# Patient Record
Sex: MALE | Race: BLACK OR AFRICAN AMERICAN | NOT HISPANIC OR LATINO | Employment: FULL TIME | URBAN - METROPOLITAN AREA
[De-identification: names, ages, dates, MRNs, and addresses within clinical notes are randomized per-mention and may not be internally consistent; named-entity substitution may affect disease eponyms.]

---

## 2017-05-30 ENCOUNTER — ALLSCRIPTS OFFICE VISIT (OUTPATIENT)
Dept: OTHER | Facility: OTHER | Age: 42
End: 2017-05-30

## 2017-05-30 DIAGNOSIS — Z13.6 ENCOUNTER FOR SCREENING FOR CARDIOVASCULAR DISORDERS: ICD-10-CM

## 2017-05-30 DIAGNOSIS — E78.1 PURE HYPERGLYCERIDEMIA: ICD-10-CM

## 2017-06-05 ENCOUNTER — APPOINTMENT (OUTPATIENT)
Dept: LAB | Facility: CLINIC | Age: 42
End: 2017-06-05
Payer: COMMERCIAL

## 2017-06-05 ENCOUNTER — TRANSCRIBE ORDERS (OUTPATIENT)
Dept: LAB | Facility: CLINIC | Age: 42
End: 2017-06-05

## 2017-06-05 DIAGNOSIS — Z13.6 ENCOUNTER FOR SCREENING FOR CARDIOVASCULAR DISORDERS: ICD-10-CM

## 2017-06-05 LAB
ALBUMIN SERPL BCP-MCNC: 3.9 G/DL (ref 3.5–5)
ALP SERPL-CCNC: 64 U/L (ref 46–116)
ALT SERPL W P-5'-P-CCNC: 30 U/L (ref 12–78)
ANION GAP SERPL CALCULATED.3IONS-SCNC: 5 MMOL/L (ref 4–13)
AST SERPL W P-5'-P-CCNC: 20 U/L (ref 5–45)
BASOPHILS # BLD AUTO: 0.02 THOUSANDS/ΜL (ref 0–0.1)
BASOPHILS NFR BLD AUTO: 0 % (ref 0–1)
BILIRUB SERPL-MCNC: 0.59 MG/DL (ref 0.2–1)
BUN SERPL-MCNC: 9 MG/DL (ref 5–25)
CALCIUM SERPL-MCNC: 8.8 MG/DL (ref 8.3–10.1)
CHLORIDE SERPL-SCNC: 104 MMOL/L (ref 100–108)
CHOLEST SERPL-MCNC: 192 MG/DL (ref 50–200)
CO2 SERPL-SCNC: 27 MMOL/L (ref 21–32)
CREAT SERPL-MCNC: 1.1 MG/DL (ref 0.6–1.3)
EOSINOPHIL # BLD AUTO: 0.08 THOUSAND/ΜL (ref 0–0.61)
EOSINOPHIL NFR BLD AUTO: 2 % (ref 0–6)
ERYTHROCYTE [DISTWIDTH] IN BLOOD BY AUTOMATED COUNT: 14.9 % (ref 11.6–15.1)
GFR SERPL CREATININE-BSD FRML MDRD: >60 ML/MIN/1.73SQ M
GLUCOSE P FAST SERPL-MCNC: 85 MG/DL (ref 65–99)
HCT VFR BLD AUTO: 45 % (ref 36.5–49.3)
HDLC SERPL-MCNC: 36 MG/DL (ref 40–60)
HGB BLD-MCNC: 15 G/DL (ref 12–17)
LDLC SERPL CALC-MCNC: 105 MG/DL (ref 0–100)
LYMPHOCYTES # BLD AUTO: 2.63 THOUSANDS/ΜL (ref 0.6–4.47)
LYMPHOCYTES NFR BLD AUTO: 48 % (ref 14–44)
MCH RBC QN AUTO: 27.6 PG (ref 26.8–34.3)
MCHC RBC AUTO-ENTMCNC: 33.3 G/DL (ref 31.4–37.4)
MCV RBC AUTO: 83 FL (ref 82–98)
MONOCYTES # BLD AUTO: 0.27 THOUSAND/ΜL (ref 0.17–1.22)
MONOCYTES NFR BLD AUTO: 5 % (ref 4–12)
NEUTROPHILS # BLD AUTO: 2.47 THOUSANDS/ΜL (ref 1.85–7.62)
NEUTS SEG NFR BLD AUTO: 45 % (ref 43–75)
NRBC BLD AUTO-RTO: 0 /100 WBCS
PLATELET # BLD AUTO: 252 THOUSANDS/UL (ref 149–390)
PMV BLD AUTO: 10.3 FL (ref 8.9–12.7)
POTASSIUM SERPL-SCNC: 4.5 MMOL/L (ref 3.5–5.3)
PROT SERPL-MCNC: 7.5 G/DL (ref 6.4–8.2)
RBC # BLD AUTO: 5.44 MILLION/UL (ref 3.88–5.62)
SODIUM SERPL-SCNC: 136 MMOL/L (ref 136–145)
TRIGL SERPL-MCNC: 256 MG/DL
TSH SERPL DL<=0.05 MIU/L-ACNC: 0.85 UIU/ML (ref 0.36–3.74)
WBC # BLD AUTO: 5.48 THOUSAND/UL (ref 4.31–10.16)

## 2017-06-05 PROCEDURE — 84443 ASSAY THYROID STIM HORMONE: CPT

## 2017-06-05 PROCEDURE — 85025 COMPLETE CBC W/AUTO DIFF WBC: CPT

## 2017-06-05 PROCEDURE — 36415 COLL VENOUS BLD VENIPUNCTURE: CPT

## 2017-06-05 PROCEDURE — 80053 COMPREHEN METABOLIC PANEL: CPT

## 2017-06-05 PROCEDURE — 80061 LIPID PANEL: CPT

## 2017-06-12 ENCOUNTER — ALLSCRIPTS OFFICE VISIT (OUTPATIENT)
Dept: OTHER | Facility: OTHER | Age: 42
End: 2017-06-12

## 2017-06-28 ENCOUNTER — ALLSCRIPTS OFFICE VISIT (OUTPATIENT)
Dept: OTHER | Facility: OTHER | Age: 42
End: 2017-06-28

## 2017-11-02 ENCOUNTER — ALLSCRIPTS OFFICE VISIT (OUTPATIENT)
Dept: OTHER | Facility: OTHER | Age: 42
End: 2017-11-02

## 2017-11-02 DIAGNOSIS — Z00.00 ENCOUNTER FOR GENERAL ADULT MEDICAL EXAMINATION WITHOUT ABNORMAL FINDINGS: ICD-10-CM

## 2017-11-02 DIAGNOSIS — E78.1 PURE HYPERGLYCERIDEMIA: ICD-10-CM

## 2018-01-13 VITALS
TEMPERATURE: 96.4 F | OXYGEN SATURATION: 99 % | WEIGHT: 178 LBS | DIASTOLIC BLOOD PRESSURE: 88 MMHG | SYSTOLIC BLOOD PRESSURE: 116 MMHG | HEART RATE: 83 BPM | BODY MASS INDEX: 24.11 KG/M2 | HEIGHT: 72 IN

## 2018-01-13 VITALS
WEIGHT: 181.38 LBS | DIASTOLIC BLOOD PRESSURE: 78 MMHG | TEMPERATURE: 97.5 F | BODY MASS INDEX: 24.57 KG/M2 | HEIGHT: 72 IN | SYSTOLIC BLOOD PRESSURE: 120 MMHG

## 2018-01-14 VITALS
SYSTOLIC BLOOD PRESSURE: 100 MMHG | HEIGHT: 72 IN | DIASTOLIC BLOOD PRESSURE: 70 MMHG | BODY MASS INDEX: 24.69 KG/M2 | WEIGHT: 182.25 LBS | TEMPERATURE: 97 F

## 2018-01-15 NOTE — PROGRESS NOTES
Assessment    1  Encounter for preventive health examination (V70 0) (Z00 00)    Discussion/Summary  Impression: health maintenance visit  Patient discussion: discussed with the patient  The patient was counseled regarding diagnostic results, instructions for management, risk factor reductions, prognosis, patient and family education, impressions, risks and benefits of treatment options, importance of compliance with treatment  The treatment plan was reviewed with the patient/guardian  The patient/guardian understands and agrees with the treatment plan      Chief Complaint  yearly Pe, need drivers form completed        History of Present Illness  HM, Adult Male: The patient is being seen for a health maintenance evaluation  The last health maintenance visit was 12 month(s) ago  General Health: The patient's health since the last visit is described as good  He has regular dental visits  He denies vision problems  He denies hearing loss  Lifestyle:  He consumes a diverse and healthy diet  He exercises regularly  He uses tobacco  He consumes alcohol  He denies drug use  Screening:      Review of Systems    Constitutional: no fever and no chills  Eyes: no eyesight problems  ENT: no hearing loss  Cardiovascular: no chest pain and no palpitations  Respiratory: no shortness of breath and no wheezing  Gastrointestinal: no abdominal pain, no nausea, no vomiting, no constipation, no diarrhea and no blood in stools  Genitourinary: no dysuria  Musculoskeletal: no arthralgias and no myalgias  Integumentary: no rashes and no skin lesions  Neurological: negative for seizures, but no fainting  Psychiatric: no anxiety and no depression  Hematologic/Lymphatic: no swollen glands and no swollen glands in the neck  Active Problems    1  Acute maxillary sinusitis (461 0) (J01 00)   2  Hypertriglyceridemia (272 1) (E78 1)   3  Screening for cardiovascular condition (V81 2) (Z13 6)   4   SOB (shortness of breath) on exertion (786 05) (R06 02)    Family History  Mother    · Family history of human immunodeficiency virus infection (V18 8) (Z83 1)    Social History    · Current every day smoker (305 1) (F17 200)   · Employed   · Has 7 children   ·    · Occasional alcohol use    Allergies    1  No Known Drug Allergies    Vitals   Recorded: [id number] 03:18PM   Temperature 98 F   Heart Rate 95   Systolic 813   Diastolic 86   Height 5 ft 10 5 in   Weight 173 lb    BMI Calculated 24 47   BSA Calculated 1 97     Physical Exam    Constitutional   General appearance: No acute distress, well appearing and well nourished  Eyes   Conjunctiva and lids: No erythema, swelling or discharge  Pupils and irises: Equal, round, reactive to light  Ophthalmoscopic examination: Normal fundi and optic discs  Ears, Nose, Mouth, and Throat   External inspection of ears and nose: Normal     Otoscopic examination: Tympanic membranes translucent with normal light reflex  Canals patent without erythema  Nasal mucosa, septum, and turbinates: Normal without edema or erythema  Lips, teeth, and gums: Normal, good dentition  Oropharynx: Normal with no erythema, edema, exudate or lesions  Neck   Neck: Supple, symmetric, trachea midline, no masses  Pulmonary   Respiratory effort: No increased work of breathing or signs of respiratory distress  Auscultation of lungs: Clear to auscultation  Cardiovascular   Auscultation of heart: Normal rate and rhythm, normal S1 and S2, no murmurs  Examination of extremities for edema and/or varicosities: Normal     Abdomen   Abdomen: Non-tender, no masses  Liver and spleen: No hepatomegaly or splenomegaly  Lymphatic   Palpation of lymph nodes in neck: No lymphadenopathy  Musculoskeletal   Gait and station: Normal     Inspection/palpation of digits and nails: Normal without clubbing or cyanosis  Skin   Skin and subcutaneous tissue: Normal without rashes or lesions  Psychiatric   Mood and affect: Normal        Procedure    Procedure:   Results: 20/25 in both eyes with corrective device, 20/25 in the right eye with corrective device, 20/20 in the left eye with corrective device      Future Appointments    Date/Time Provider Specialty Site   06/12/2018 04:00 PM Nicole Hawkins, [address] [place] [address]   Electronically signed by : Phillip Ortiz DO; Nov 2 2017  3:37PM EST                       (Author)

## 2018-01-22 VITALS
TEMPERATURE: 98 F | BODY MASS INDEX: 24.22 KG/M2 | HEIGHT: 71 IN | HEART RATE: 95 BPM | SYSTOLIC BLOOD PRESSURE: 130 MMHG | WEIGHT: 173 LBS | DIASTOLIC BLOOD PRESSURE: 86 MMHG

## 2018-09-25 DIAGNOSIS — J45.909 MILD ASTHMA, UNSPECIFIED WHETHER COMPLICATED, UNSPECIFIED WHETHER PERSISTENT: Primary | ICD-10-CM

## 2018-09-25 RX ORDER — ALBUTEROL SULFATE 90 UG/1
2 AEROSOL, METERED RESPIRATORY (INHALATION) EVERY 6 HOURS PRN
Qty: 1 INHALER | Refills: 0 | Status: SHIPPED | OUTPATIENT
Start: 2018-09-25 | End: 2019-01-16

## 2019-01-16 ENCOUNTER — HOSPITAL ENCOUNTER (EMERGENCY)
Facility: HOSPITAL | Age: 44
Discharge: HOME | End: 2019-01-16
Attending: EMERGENCY MEDICINE | Admitting: EMERGENCY MEDICINE
Payer: COMMERCIAL

## 2019-01-16 VITALS
TEMPERATURE: 97.7 F | OXYGEN SATURATION: 99 % | RESPIRATION RATE: 16 BRPM | WEIGHT: 174 LBS | DIASTOLIC BLOOD PRESSURE: 73 MMHG | SYSTOLIC BLOOD PRESSURE: 115 MMHG | BODY MASS INDEX: 24.61 KG/M2 | HEART RATE: 73 BPM

## 2019-01-16 DIAGNOSIS — Z87.898 HX OF DIARRHEA: Primary | ICD-10-CM

## 2019-01-16 PROCEDURE — 99283 EMERGENCY DEPT VISIT LOW MDM: CPT

## 2019-01-16 NOTE — DISCHARGE INSTRUCTIONS
Stay well hydrated  If uncontrolled / worsening symptoms - be rechecked    Acute Diarrhea   WHAT YOU NEED TO KNOW:   What is acute diarrhea? Acute diarrhea starts quickly and lasts a short time, usually 1 to 3 days  It can last up to 2 weeks  What causes acute diarrhea? · Bacteria, such as E coli or salmonella    · Viruses, such as rotavirus and norovirus    · A parasite, such as giardia    · Medicines, such as laxatives, antacids, or antibiotics    · An allergy to lactose, soy, or gluten    · Eating food or drinking water that contains germs    · Medical treatments, such as chemotherapy or radiation  What other signs and symptoms might I have with acute diarrhea? You may have 3 or more episodes of diarrhea  It may be hard to control your diarrhea  You may also have any of the following:  · Fever and chills    · Headache or abdominal pain    · Nausea and vomiting    · Symptoms of dehydration such as thirst, decreased urination, dry skin, sunken eyes, or fast, pounding heartbeat  What does my healthcare provider need to know about my acute diarrhea? Your healthcare provider will ask about your symptoms  He or she will ask what you have recently eaten and if you have traveled to other countries  Tell the provider what medicines you use or if you have been around anyone who is sick  Your healthcare provider may check you for signs of dehydration  How is acute diarrhea treated? Acute diarrhea usually gets better without treatment  You may need any of the following if your diarrhea is severe or lasts longer than a few days:  · Diarrhea medicine  is an over-the-counter medicine that helps slow or stop your diarrhea  · Antibiotics  may be given to help treat an infection caused by bacteria  · Parasite medicine  may be given to treat an infection caused by parasites  How can acute diarrhea be managed? · Drink liquids as directed  Liquids will help prevent dehydration caused by diarrhea   Ask your healthcare provider how much liquid to drink each day and which liquids are best for you  You may need to drink an oral rehydration solution (ORS)  An ORS has the right amounts of water, salts, and sugar you need to replace body fluids  You can buy an ORS at most grocery stores and pharmacies  · Eat foods that are easy to digest   Examples include rice, lentils, cereal, bananas, potatoes, and bread  It also includes some fruits (bananas, melon), well-cooked vegetables, and lean meats  Avoid foods high in fiber, fat, and sugar  Also avoid caffeine, alcohol, dairy, and red meat until your diarrhea is gone  How can acute diarrhea be prevented? · Wash your hands often  Use soap and water  Wash your hands before you eat or prepare food  Also wash your hands after you use the bathroom  Use an alcohol-based hand gel when soap and water are not available  · Keep bathroom surfaces clean  This helps prevent the spread of germs that cause acute diarrhea  · Wash fruits and vegetables well before you eat them  This can help remove germs that cause diarrhea  If possible, remove the skin from fruits and vegetables, or cook them well before you eat them  · Cook meat as directed  ¨ Cook ground meat  to 160°F      ¨ Cook ground poultry, whole poultry, or cuts of poultry  to at least 165°F  Remove the meat from heat  Let it stand for 3 minutes before you eat it  ¨ Cook whole cuts of meat other than poultry  to at least 145°F  Remove the meat from heat  Let it stand for 3 minutes before you eat it  · Wash dishes that have touched raw meat with hot water and soap  This includes cutting boards, utensils, dishes, and serving containers  · Place raw or cooked meat in the refrigerator as soon as possible  Bacteria can grow in meat that is left at room temperature too long  · Do not eat raw or undercooked oysters, clams, or mussels  These foods may be contaminated and cause infection       · Drink filtered or treated water only when you travel  Do not put ice in your drinks  Drink bottled water whenever possible  When should I seek immediate care? · You feel confused  · Your heartbeat is faster than normal      · Your eyes look deeply sunken, or you have no tears when you cry  · You urinate less than usual, or your urine is dark yellow  · You have blood or mucus in your stools  · You have severe abdominal pain  · You are unable to drink any liquids  When should I contact my healthcare provider? · Your symptoms do not get better with treatment  · You have a fever higher than 101 3°F (38 5°C)  · You have trouble eating and drinking because you are vomiting  · You are thirsty or have a dry mouth  · Your diarrhea does not get better in 7 days  · You have questions or concerns about your condition or care  CARE AGREEMENT:   You have the right to help plan your care  Learn about your health condition and how it may be treated  Discuss treatment options with your caregivers to decide what care you want to receive  You always have the right to refuse treatment  The above information is an  only  It is not intended as medical advice for individual conditions or treatments  Talk to your doctor, nurse or pharmacist before following any medical regimen to see if it is safe and effective for you  © 2017 [employer] Information is for End User's use only and may not be sold, redistributed or otherwise used for commercial purposes  All illustrations and images included in CareNotes® are the copyrighted property of A [employer]  or Jitendra Farfan

## 2019-01-16 NOTE — ED PROVIDER NOTES
History  Chief Complaint   Patient presents with    Diarrhea     pt had diarrhea for 3 days but now resolved  needs note to return to work     Pt gives hx of having 3 days of diarrheal was yesterday  Has had no nausea/vomiting  Is eating /drinking today without problem  Pt admits he needs a work note and that is why he is here  Pt  Denies fever/chills  No resp sx  No abd pain  No dark or bloody stool or urine  History provided by:  Patient  Diarrhea   Quality:  Watery  Progression:  Resolved  Ineffective treatments:  None tried  Associated symptoms: no abdominal pain, no arthralgias, no chills, no recent cough, no diaphoresis, no fever, no headaches, no myalgias, no URI and no vomiting    Risk factors: no recent antibiotic use, no sick contacts, no suspicious food intake and no travel to endemic areas        None       History reviewed  No pertinent past medical history  History reviewed  No pertinent surgical history  Family History   Problem Relation Age of Onset   [facility]      I have reviewed and agree with the history as documented  Social History   Substance Use Topics    Smoking status: Current Every Day Smoker    Smokeless tobacco: Never Used    Alcohol use Yes      Comment: occasional        Review of Systems   Constitutional: Negative for chills, diaphoresis and fever  HENT: Negative  Eyes: Negative  Respiratory: Negative  Negative for chest tightness and shortness of breath  Cardiovascular: Negative  Negative for chest pain  Gastrointestinal: Positive for diarrhea  Negative for abdominal pain and vomiting  Genitourinary: Negative  Negative for dysuria, flank pain, hematuria and urgency  Musculoskeletal: Negative  Negative for arthralgias and myalgias  Skin: Negative  Negative for pallor, rash and wound  Neurological: Negative  Negative for dizziness and headaches  All other systems reviewed and are negative        Physical Exam  Physical Exam Constitutional: He is oriented to person, place, and time  He appears well-developed and well-nourished  He is active and cooperative  Non-toxic appearance  He does not have a sickly appearance  He does not appear ill  No distress  HENT:   Head: Normocephalic and atraumatic  Mouth/Throat: Oropharynx is clear and moist  Mucous membranes are not dry and not cyanotic  No oropharyngeal exudate, posterior oropharyngeal edema or posterior oropharyngeal erythema  Eyes: Pupils are equal, round, and reactive to light  Conjunctivae and EOM are normal    Neck: Normal range of motion  Neck supple  Cardiovascular: Normal rate, regular rhythm, intact distal pulses and normal pulses  No extrasystoles are present  Pulmonary/Chest: Effort normal  No respiratory distress  He has no wheezes  He has no rhonchi  He has no rales  Abdominal: Soft  He exhibits no distension and no ascites  Bowel sounds are increased  There is no tenderness  There is no rigidity, no guarding and no CVA tenderness  Neurological: He is alert and oriented to person, place, and time  He has normal strength  Skin: Skin is warm and dry  No rash noted  He is not diaphoretic  No cyanosis  Psychiatric: He has a normal mood and affect  His speech is normal and behavior is normal  Thought content normal  Cognition and memory are normal    Vitals reviewed        Vital Signs  ED Triage Vitals [01/16/19 1700]   Temperature Pulse Respirations Blood Pressure SpO2   97 7 °F (36 5 °C) 73 16 115/73 99 %      Temp Source Heart Rate Source Patient Position - Orthostatic VS BP Location FiO2 (%)   Temporal Right Sitting Right arm --      Pain Score       No Pain           Vitals:    01/16/19 1700   BP: 115/73   Pulse: 73   Patient Position - Orthostatic VS: Sitting       Visual Acuity      ED Medications  Medications - No data to display    Diagnostic Studies  Results Reviewed     None                 No orders to display Procedures  Procedures       Phone Contacts  ED Phone Contact    ED Course                               MDM  CritCare Time    Disposition  Final diagnoses:   Hx of diarrhea     Time reflects when diagnosis was documented in both MDM as applicable and the Disposition within this note     Time User Action Codes Description Comment    1/16/2019  5:04 PM Olivia English Add [B62 138] Hx of diarrhea       ED Disposition     ED Disposition Condition Comment    Discharge  Selvin Ortega discharge to home/self care  Condition at discharge: Good        Follow-up Information     Follow up With Specialties Details Why [address], [address] [facility]  [phone]            There are no discharge medications for this patient  No discharge procedures on file      ED Provider  Electronically Signed by           Sunitha Carbajal DO  01/17/19 [id number]